# Patient Record
Sex: FEMALE
[De-identification: names, ages, dates, MRNs, and addresses within clinical notes are randomized per-mention and may not be internally consistent; named-entity substitution may affect disease eponyms.]

---

## 2019-07-16 ENCOUNTER — RESULT REVIEW (OUTPATIENT)
Age: 64
End: 2019-07-16

## 2020-05-04 PROBLEM — Z00.00 ENCOUNTER FOR PREVENTIVE HEALTH EXAMINATION: Status: ACTIVE | Noted: 2020-05-04

## 2020-05-05 ENCOUNTER — APPOINTMENT (OUTPATIENT)
Dept: ORTHOPEDIC SURGERY | Facility: CLINIC | Age: 65
End: 2020-05-05
Payer: MEDICARE

## 2020-05-05 ENCOUNTER — TRANSCRIPTION ENCOUNTER (OUTPATIENT)
Age: 65
End: 2020-05-05

## 2020-05-05 VITALS
HEART RATE: 80 BPM | DIASTOLIC BLOOD PRESSURE: 85 MMHG | HEIGHT: 70 IN | WEIGHT: 165 LBS | SYSTOLIC BLOOD PRESSURE: 167 MMHG | BODY MASS INDEX: 23.62 KG/M2

## 2020-05-05 DIAGNOSIS — Z60.2 PROBLEMS RELATED TO LIVING ALONE: ICD-10-CM

## 2020-05-05 DIAGNOSIS — Z86.79 PERSONAL HISTORY OF OTHER DISEASES OF THE CIRCULATORY SYSTEM: ICD-10-CM

## 2020-05-05 DIAGNOSIS — Z82.49 FAMILY HISTORY OF ISCHEMIC HEART DISEASE AND OTHER DISEASES OF THE CIRCULATORY SYSTEM: ICD-10-CM

## 2020-05-05 DIAGNOSIS — M25.562 PAIN IN LEFT KNEE: ICD-10-CM

## 2020-05-05 PROCEDURE — 99203 OFFICE O/P NEW LOW 30 MIN: CPT

## 2020-05-05 RX ORDER — MULTIVITAMIN
TABLET ORAL
Refills: 0 | Status: ACTIVE | COMMUNITY

## 2020-05-05 RX ORDER — CLOTRIMAZOLE 10 MG/G
1 CREAM TOPICAL
Qty: 45 | Refills: 0 | Status: ACTIVE | COMMUNITY
Start: 2019-04-23

## 2020-05-05 RX ORDER — LISINOPRIL 40 MG/1
40 TABLET ORAL
Qty: 90 | Refills: 0 | Status: ACTIVE | COMMUNITY
Start: 2020-04-17

## 2020-05-05 SDOH — SOCIAL STABILITY - SOCIAL INSECURITY: PROBLEMS RELATED TO LIVING ALONE: Z60.2

## 2020-05-05 NOTE — ASSESSMENT
[FreeTextEntry1] : 64-year-old woman with about 5 months of pain in her LEFT knee worse recently. She does have tears of the meniscus likely degenerative given the lack of any significant trauma. Perhaps the tear extended further couple weeks ago when she had sudden increase in pain. There is some swelling 10 loss of knee flexion on the LEFT.\par I recommended starting with nonoperative treatment including anti-inflammatories such as ibuprofen 400 mg t.i.d. with meals. Heat and ice. Leg lifts to do strengthening around the knee. She should work on having the knee go fully straight and increased bending as tolerated. Physical therapy was prescribed which she may be able to do virtually versus going in when she is comfortable.\par If it's not getting better then we may try a corticosteroid injection. Ultimately if pain persists then arthroscopic surgery may be considered but hopefully we can get this better without surgery.\par Followup in about 6 weeks.

## 2020-05-05 NOTE — PHYSICAL EXAM
[Rad] : radial 2+ and symmetric bilaterally [Normal Touch] : sensation intact for touch [LE] : Sensory: Intact in bilateral lower extremities [PT] : posterior tibial 2+ and symmetric bilaterally [DP] : dorsalis pedis 2+ and symmetric bilaterally [Normal RLE] : Right Lower Extremity: No scars, rashes, lesions, ulcers, skin intact [Normal LLE] : Left Lower Extremity: No scars, rashes, lesions, ulcers, skin intact [Obese] : not obese [Normal] : Oriented to person, place, and time, insight and judgement were intact and the affect was normal [de-identified] : Knees:\par antalgic gait. initially she was walking on a bent knee and I encouraged her to cut up walk more normally but she still had to walk slowly and carefully because of discomfort\par Small effusion LEFT knee. RIGHT knee no effusion.\par - erythema, edema, warmth.\par Tender medial joint line of the LEFT knee. Mildly tender lateral joint line LEFT knee. Nontender joint lines RIGHT knee\par ROM: 0 degrees extension to 115° flexion with pain on the LEFT knee versus 125° flexion RIGHT knee without pain. No significant crepitus\par Slight pain LEFT knee with Crow.\par 1A Lachman.  - Pivot shift. - posterior drawer. Normal rotational, varus/valgus laxity.\par Intact extensor mechanism.\par NVI distally.\par  [de-identified] : No respiratory distress or coughing [de-identified] : \par An MRI of the LEFT knee performed April 21, 2020 showed complex tear posterior horn medial meniscus with mild medial extrusion and mild reactive subchondral bone marrow edema in the tibial plateau horizontal cleavage tear anterior horn lateral meniscus with lateral meniscal cyst and moderate knee joint effusion with moderate Baker's cyst partially ruptured.\par There is mild to moderate partial-thickness cartilage loss in the medial compartment as well as in the lateral compartment. Patellofemoral cartilage is intact.

## 2020-05-05 NOTE — HISTORY OF PRESENT ILLNESS
[de-identified] : Ms. Markham is a 64-year-old woman with LEFT knee pain. Her pain started without any injury or inciting event in January of this year. She noticed that she was limping. When she gets up from sitting it was very stiff and would need to wait before walking.\par Her pain is about a 4/10 and achy better with rest and ice and worse when walking. She hasn't been taking anything regularly for pain because the pain is variable and she can tolerate it. About 2 weeks ago she was at work at the [employer] and could barely walk because of pain. She saw her doctor and had x-rays done which she brought in today and were scanned into the record. They show perhaps mild medial joint space narrowing/arthritis. It was then referred for an MRI. The MRI report is also in the record showed a complex tear of the medial meniscus and horizontal tear lateral meniscus and chondral wear medial and lateral compartments.\par She is walking with a cane. No prior knee injuries or problems. She hasn't done any physical therapy or any injections or other treatment.

## 2020-09-15 PROBLEM — M94.262 CHONDROMALACIA, KNEE, LEFT: Status: ACTIVE | Noted: 2020-05-05

## 2020-09-16 ENCOUNTER — APPOINTMENT (OUTPATIENT)
Dept: ORTHOPEDIC SURGERY | Facility: CLINIC | Age: 65
End: 2020-09-16
Payer: MEDICARE

## 2020-09-16 DIAGNOSIS — M94.262 CHONDROMALACIA, LEFT KNEE: ICD-10-CM

## 2020-09-16 PROCEDURE — 99214 OFFICE O/P EST MOD 30 MIN: CPT

## 2020-09-16 PROCEDURE — 73564 X-RAY EXAM KNEE 4 OR MORE: CPT | Mod: 50

## 2020-09-16 NOTE — HISTORY OF PRESENT ILLNESS
[de-identified] : Ms. Markham comes in for f/u LEFT knee pain. Her RIGHT knee hurts a little bit sometimes but the LEFT is worse than the RIGHT. She went to physical therapy starting in May after I saw her and did about 3 months of therapy. She states that it did help but didn't take away all the pain. Her knee still swells intermittently. She applies ice. She might take the ibuprofen once a week. She wears a sleeve around her knee when walking. Intermittently it will hurt and could be up to a 4/10.\par RIGHT knee hurts intermittently as well but not quite as much and not with as much swelling as the LEFT.\par She walks a lot for her job and plans on retiring later this year possibly

## 2020-09-16 NOTE — ASSESSMENT
[FreeTextEntry1] : 64-year-old woman with moderate to severe RIGHT knee osteoarthritis which is less symptomatic than her LEFT knee where she has more mild to moderate arthritis. LEFT knee changes are more medial and patellofemoral versus the RIGHT knee is more severely arthritic in the lateral compartment.\par She is not anxious to consider any injections or surgery but we talked about these as possibilities in the future. With the arthritis the results of that knee arthroscopy and partial meniscectomy is much less predictable and hopefully we can get her better without surgery. I did offer her a steroid injection and talked about hyaluronic acid injections. She may consider these in the future. He can do a little more therapy and get on a good home program of exercises. Heat and ice as needed. Ibuprofen as needed. It's important to keep her weight down which is good.\par Followup as needed

## 2020-09-16 NOTE — PHYSICAL EXAM
[LE] : 5/5 motor strength in bilateral lower extremities [DP] : dorsalis pedis 2+ and symmetric bilaterally [PT] : posterior tibial 2+ and symmetric bilaterally [Normal RLE] : Right Lower Extremity: No scars, rashes, lesions, ulcers, skin intact [Rad] : radial 2+ and symmetric bilaterally [Normal LLE] : Left Lower Extremity: No scars, rashes, lesions, ulcers, skin intact [Normal Touch] : sensation intact for touch [Normal] : Gait: normal [Obese] : not obese [de-identified] : Knees:\par minimally antalgic gait. \par No effusion\par - erythema, edema, warmth.\par Tender medial joint line of the LEFT knee. Mildly tender lateral joint line LEFT knee. Mildly tender lateral joint line RIGHT knee\par ROM: 0 degrees extension to 125° flexion with pain on the LEFT knee and 125° flexion RIGHT knee with Mild pain on full flexion. No significant crepitus\par Slight pain LEFT knee with Crow.\par 1A Lachman.  - Pivot shift. - posterior drawer. Normal rotational, varus/valgus laxity.\par Intact extensor mechanism.\par NVI distally.\par  [de-identified] : No respiratory distress or coughing [de-identified] : \par An MRI of the LEFT knee performed April 21, 2020 showed complex tear posterior horn medial meniscus with mild medial extrusion and mild reactive subchondral bone marrow edema in the tibial plateau horizontal cleavage tear anterior horn lateral meniscus with lateral meniscal cyst and moderate knee joint effusion with moderate Baker's cyst partially ruptured.\par There is mild to moderate partial-thickness cartilage loss in the medial compartment as well as in the lateral compartment. Patellofemoral cartilage is intact.\par \par X-rays bilateral knees weightbearing 4 views today show moderate to severe lateral compartment degenerative changes in the RIGHT knee and almost bone-on-bone on the flexed view with osteophytes and sclerosis and more mild degeneration in the medial compartment of the RIGHT knee and patellofemoral joint.\par In the LEFT knee there is mild to moderate medial joint space narrowing and tiny osteophytes medial compartment. Normal lateral compartment. Small spur on the patella.

## 2021-01-26 PROBLEM — S83.282A TEAR OF LATERAL MENISCUS OF LEFT KNEE, CURRENT, UNSPECIFIED TEAR TYPE, INITIAL ENCOUNTER: Status: ACTIVE | Noted: 2020-05-05

## 2021-01-26 PROBLEM — S83.232A COMPLEX TEAR OF MEDIAL MENISCUS OF LEFT KNEE AS CURRENT INJURY, INITIAL ENCOUNTER: Status: ACTIVE | Noted: 2020-05-05

## 2021-01-26 PROBLEM — M17.0 PRIMARY OSTEOARTHRITIS OF BOTH KNEES: Status: ACTIVE | Noted: 2020-09-16

## 2021-01-27 ENCOUNTER — APPOINTMENT (OUTPATIENT)
Dept: ORTHOPEDIC SURGERY | Facility: CLINIC | Age: 66
End: 2021-01-27
Payer: MEDICARE

## 2021-01-27 DIAGNOSIS — M17.0 BILATERAL PRIMARY OSTEOARTHRITIS OF KNEE: ICD-10-CM

## 2021-01-27 DIAGNOSIS — S83.232A COMPLEX TEAR OF MEDIAL MENISCUS, CURRENT INJURY, LEFT KNEE, INITIAL ENCOUNTER: ICD-10-CM

## 2021-01-27 DIAGNOSIS — S83.282A OTHER TEAR OF LATERAL MENISCUS, CURRENT INJURY, LEFT KNEE, INITIAL ENCOUNTER: ICD-10-CM

## 2021-01-27 PROCEDURE — 99214 OFFICE O/P EST MOD 30 MIN: CPT

## 2021-01-27 NOTE — ASSESSMENT
[FreeTextEntry1] : 65-year-old woman with moderate to severe RIGHT knee osteoarthritis which is less symptomatic than her LEFT knee where she has more mild to moderate arthritis but also a complex degenerative MMT. LEFT knee changes are more medial and patellofemoral versus the RIGHT knee is more severely arthritic in the lateral compartment.\par I discussed the treatment for her arthritis. I think the arthritis is the primary issue. She does have a meniscus tear which may contribute some symptoms but there is no significant pain today on exam and no mechanical symptoms that would push me to recommend arthroscopic surgery at this time. I have offered a corticosteroid injection which I would want to try first. She would not want to do that given the intermittent pain.\par We also talked about hyaluronic acid injections. I suggested that if she does need to take stairs or walk a lot and she could take some ibuprofen or Tylenol as needed.\par She does not have the severe disability at this time to warrant parking permit that we can reevaluate that in the future.\par \par Followup as needed

## 2021-01-27 NOTE — PHYSICAL EXAM
[LE] : Sensory: Intact in bilateral lower extremities [DP] : dorsalis pedis 2+ and symmetric bilaterally [PT] : posterior tibial 2+ and symmetric bilaterally [Rad] : radial 2+ and symmetric bilaterally [Normal RLE] : Right Lower Extremity: No scars, rashes, lesions, ulcers, skin intact [Normal Touch] : sensation intact for touch [Normal LLE] : Left Lower Extremity: No scars, rashes, lesions, ulcers, skin intact [Normal] : Oriented to person, place, and time, insight and judgement were intact and the affect was normal [Obese] : not obese [de-identified] : Knees:\par Nonantalgic gait. \par No effusion\par - erythema, edema, warmth.\par Tender anteromedial joint line of the LEFT knee. Mildly tender patella facets and lateral joint line LEFT knee. Mildly tender lateral joint line RIGHT knee\par ROM: 0 degrees extension to 125° flexion with pain on the LEFT knee and 125° flexion RIGHT knee with Mild pain on full flexion. + PF crepitus\par Slight pain LEFT knee with Crow.\par 1A Lachman.  - Pivot shift. - posterior drawer. Normal rotational, varus/valgus laxity.\par Intact extensor mechanism.\par NVI distally.\par  [de-identified] : No respiratory distress or coughing [de-identified] : \par An MRI of the LEFT knee performed April 21, 2020 showed complex tear posterior horn medial meniscus with mild medial extrusion and mild reactive subchondral bone marrow edema in the tibial plateau horizontal cleavage tear anterior horn lateral meniscus with lateral meniscal cyst and moderate knee joint effusion with moderate Baker's cyst partially ruptured.\par There is mild to moderate partial-thickness cartilage loss in the medial compartment as well as in the lateral compartment. Patellofemoral cartilage is intact.\par \par X-rays bilateral knees weightbearing 4 views 9/16/20 showed moderate to severe lateral compartment degenerative changes in the RIGHT knee and almost bone-on-bone on the flexed view with osteophytes and sclerosis and more mild degeneration in the medial compartment of the RIGHT knee and patellofemoral joint.\par In the LEFT knee there is mild to moderate medial joint space narrowing and tiny osteophytes medial compartment. Normal lateral compartment. Small spur on the patella.

## 2021-01-27 NOTE — HISTORY OF PRESENT ILLNESS
[de-identified] : Ms. Markham comes in for f/u knee pain. Her RIGHT knee hurts a little bit sometimes but the LEFT is worse than the RIGHT. \par Her knees are doing about the same as they were in early September. She gets variable pains in both knees aggravated by walking arm or commonly stairs. She started driving to work so she wouldn't have to do the subway stairs or occasionally she'll take the bus and leave early. She was wondering about disability parking permit. She does not walk with any assistive devices. She does not take any medication on a regular basis for pain but occasionally will take Advil. She prefers not to take pills.\par No swelling or locking or buckling.\par She still is at a point she would consider an injection because the pain is fairly intermittent.

## 2022-08-16 ENCOUNTER — NON-APPOINTMENT (OUTPATIENT)
Age: 67
End: 2022-08-16

## 2022-08-16 ENCOUNTER — APPOINTMENT (OUTPATIENT)
Dept: OPHTHALMOLOGY | Facility: CLINIC | Age: 67
End: 2022-08-16

## 2022-08-16 PROCEDURE — 92250 FUNDUS PHOTOGRAPHY W/I&R: CPT

## 2022-08-16 PROCEDURE — 92004 COMPRE OPH EXAM NEW PT 1/>: CPT

## 2022-08-16 PROCEDURE — 92136 OPHTHALMIC BIOMETRY: CPT

## 2023-05-08 ENCOUNTER — NON-APPOINTMENT (OUTPATIENT)
Age: 68
End: 2023-05-08

## 2023-05-08 ENCOUNTER — APPOINTMENT (OUTPATIENT)
Dept: OPHTHALMOLOGY | Facility: CLINIC | Age: 68
End: 2023-05-08
Payer: MEDICARE

## 2023-05-08 PROCEDURE — 92014 COMPRE OPH EXAM EST PT 1/>: CPT

## 2024-08-01 ENCOUNTER — APPOINTMENT (OUTPATIENT)
Dept: OPHTHALMOLOGY | Facility: CLINIC | Age: 69
End: 2024-08-01
Payer: MEDICARE

## 2024-08-01 ENCOUNTER — NON-APPOINTMENT (OUTPATIENT)
Age: 69
End: 2024-08-01

## 2024-08-01 PROCEDURE — 92136 OPHTHALMIC BIOMETRY: CPT

## 2024-08-01 PROCEDURE — 92014 COMPRE OPH EXAM EST PT 1/>: CPT

## 2025-07-24 ENCOUNTER — APPOINTMENT (OUTPATIENT)
Dept: OPHTHALMOLOGY | Facility: CLINIC | Age: 70
End: 2025-07-24
Payer: MEDICARE

## 2025-07-24 ENCOUNTER — NON-APPOINTMENT (OUTPATIENT)
Age: 70
End: 2025-07-24

## 2025-07-24 PROCEDURE — 92014 COMPRE OPH EXAM EST PT 1/>: CPT
